# Patient Record
Sex: FEMALE | Race: WHITE | HISPANIC OR LATINO
[De-identification: names, ages, dates, MRNs, and addresses within clinical notes are randomized per-mention and may not be internally consistent; named-entity substitution may affect disease eponyms.]

---
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
## 2025-03-31 NOTE — ED ADULT NURSE NOTE - CAS EDP DISCH TYPE
HBO PROGRESS NOTE      NAME: Jacqueline Tay  MEDICAL RECORD NUMBER:  [id number]  AGE: 74 y.o.   GENDER: female  : 1950  EPISODE DATE:  3/31/2025     Subjective     HBO Treatment Number: 16 out of Total Treatments: 40    HBO Diagnosis:             Indications: Lower Extremity Diabetic Wound ___(site) (Left Foot)    Safety checks performed prior to treatment.  See doc flowsheets for documentation.    Objective        Recent Labs     25  1039 25  1229   POCGLU 137* 124*     Pre treatment Vital Signs       Temp: 98.8 °F (37.1 °C)     Pulse: 53     Respirations: 18     BP: 136/64       Post treatment Vital Signs  Temp: 98.8 °F (37.1 °C)  Pulse: 55  Respirations: 17  BP: 139/63    Assessment        HBO Diagnosis:   Problem List Items Addressed This Visit          Endocrine    * (Principal) Diabetic ulcer of toe of right foot associated with type 2 diabetes mellitus, with necrosis of bone (HCC) (Chronic)    Relevant Orders    Notify physician (specify)    Hyperbaric Oxygen Therapy    Hypoglycemial protocol    POCT glucose    Care order/instruction    Care order/instruction    Care order/instruction    Care order/instruction    Care order/instruction    Care order/instruction    Care order/instruction    Care order/instruction    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)    Relevant Orders    Notify physician (specify)    Hyperbaric Oxygen Therapy    Hypoglycemial protocol    POCT glucose    Care order/instruction    Care order/instruction    Care order/instruction    Care order/instruction    Care order/instruction    Care order/instruction    Care order/instruction    Care order/instruction       Musculoskeletal and Integument    Acute osteomyelitis (HCC) - Primary (Chronic)    Relevant Orders    Notify physician (specify)    Hyperbaric Oxygen Therapy    Hypoglycemial protocol    POCT glucose    Care order/instruction    Care order/instruction    Care order/instruction  Home